# Patient Record
Sex: FEMALE | Race: WHITE | Employment: PART TIME | URBAN - METROPOLITAN AREA
[De-identification: names, ages, dates, MRNs, and addresses within clinical notes are randomized per-mention and may not be internally consistent; named-entity substitution may affect disease eponyms.]

---

## 2017-02-21 ENCOUNTER — OFFICE VISIT (OUTPATIENT)
Dept: FAMILY MEDICINE CLINIC | Age: 37
End: 2017-02-21

## 2017-02-21 VITALS
TEMPERATURE: 98.1 F | HEART RATE: 76 BPM | RESPIRATION RATE: 18 BRPM | BODY MASS INDEX: 20.19 KG/M2 | SYSTOLIC BLOOD PRESSURE: 90 MMHG | DIASTOLIC BLOOD PRESSURE: 62 MMHG | WEIGHT: 114 LBS

## 2017-02-21 DIAGNOSIS — J06.9 URI, ACUTE: ICD-10-CM

## 2017-02-21 DIAGNOSIS — J02.9 PHARYNGITIS, UNSPECIFIED ETIOLOGY: Primary | ICD-10-CM

## 2017-02-21 LAB — S PYO AG THROAT QL: NORMAL

## 2017-02-21 PROCEDURE — 99213 OFFICE O/P EST LOW 20 MIN: CPT | Performed by: FAMILY MEDICINE

## 2017-02-21 PROCEDURE — 87880 STREP A ASSAY W/OPTIC: CPT | Performed by: FAMILY MEDICINE

## 2017-02-21 RX ORDER — AMOXICILLIN 500 MG/1
500 CAPSULE ORAL 3 TIMES DAILY
Qty: 30 CAPSULE | Refills: 0 | Status: SHIPPED | OUTPATIENT
Start: 2017-02-21 | End: 2017-03-03

## 2020-09-18 ENCOUNTER — OFFICE VISIT (OUTPATIENT)
Dept: RHEUMATOLOGY | Age: 40
End: 2020-09-18
Payer: COMMERCIAL

## 2020-09-18 VITALS — BODY MASS INDEX: 22.86 KG/M2 | TEMPERATURE: 98.2 F | HEIGHT: 63 IN | WEIGHT: 129 LBS

## 2020-09-18 PROCEDURE — 99204 OFFICE O/P NEW MOD 45 MIN: CPT | Performed by: INTERNAL MEDICINE

## 2020-09-18 NOTE — PROGRESS NOTES
[address], [place]                                                           [address] [place] [address], [address] [address] (Y) [phone] (F)      Dear Dr. Gail Galvan MD:  Please find Rheumatology assessment. Thank you for giving me the opportunity to be involved in [address] [place] [address] and I look forward following Samir Jack along with you. If you have any questions or concerns please feel free to reach me. Note is transcribed using voice recognition software. Inadvertent computerized transcription errors may be present. Patient identification: Vidya Monet: 1980,39 y.o. Sex: female     A/P  Samir Jack was seen today for establish care and joint pain. Diagnoses and all orders for this visit:    Screening for rheumatic disorder    Arthralgia, unspecified joint  -     Rheumatoid Factor; Future  -     Cyclic Citrul Peptide Antibody, IgG; Future  -     Angiotensin Converting Enzyme; Future  -     Vitamin D 25 Hydroxy; Future      Nonspecific symptoms including intermittent arthralgias in her fingers, ankles, feet, with intermittent paresthesias of her fingertips. She is concerned about physiological lymph node in right submandibular area and change in her vision in last 4 years and seeing floaters. Believe that she might have something autoimmune. Her father had sarcoidosis, concerned about the possibility of having it, tells me that she read the literature, thought that it fits her symptoms-of lymphadenopathy and eye problems.   Rheumatic work-up including TONY, SSA, SSB, CRP, CBC D, CMP is normal.    I acknowledge that she is right- sarcoidosis can cause lymphadenopathy and ocular inflammation however Attempted to call patient via Post Procedural Symptom Tracker. Pt did not answer on day 13. Will attempt to call tomorrow. No contact protocol in place.   Tobacco comment: advised to quit   Substance and Sexual Activity    Alcohol use: Yes     Alcohol/week: 4.0 standard drinks     Types: 4 Standard drinks or equivalent per week    Drug use: No    Sexual activity: Yes     Partners: Male   Lifestyle    Physical activity     Days per week: Not on file     Minutes per session: Not on file    Stress: Not on file   Relationships    Social connections     Talks on phone: Not on file     Gets together: Not on file     Attends Yazdanism service: Not on file     Active member of club or organization: Not on file     Attends meetings of clubs or organizations: Not on file     Relationship status: Not on file    Intimate partner violence     Fear of current or ex partner: Not on file     Emotionally abused: Not on file     Physically abused: Not on file     Forced sexual activity: Not on file   Other Topics Concern    Not on file   Social History Narrative    Not on file       FH- Dad sarcoidosis. No current outpatient medications on file. No current facility-administered medications for this visit. No Known Allergies    PHYSICAL EXAM:    Vitals:    Temp 98.2 °F (36.8 °C) (Skin)   Ht 5' 3\" (1.6 m)   Wt 129 lb (58.5 kg)   BMI 22.85 kg/m²   General appearance/ Psychiatric: well nourished, and well groomed, normal judgement, alert, appears stated age and cooperative. MKS: She has completely normal musculoskeletal examination other than benign hypermobility noted in her finger joints-in upper, lower extremities and spine. No focal tenderness, swelling, synovitis, has full range of motion all peripheral joints in upper or lower extremities. Normal gait, muscle strength in upper and lower extremities. Skin: No rashes, no induration or skin thickening or nodules. No evidence ischemia or deformities noted in digits or nails. HEENT: Normal lids, lacrimal glands and pupils. No oral or nasal ulcers. Salivary glands reveal no evidence of abnormality.  External inspection of the ears and nose within normal limits. Neck: No masses or asymmetry. No thyroid enlargement. Chest: Normal effort, clear to auscultation. Heart:  Normal s1/s2, no leg edema. Lymph nodes: A small less than 0.5 mm diameter lymph node present in the right submandibular area, left armpit medial side, left groin-firm, nontender, freely mobile, is completely physiological in nature. All other areas-no lymphadenopathy including entire cervical, bilateral axillary, inguinal and elbow  Neurologic: normal deep tendon reflexes. No foot or wrist drop. DATA:   No results found for: WBC, HGB, HCT, MCV, PLT      Chemistry    No results found for: NA, K, CL, CO2, BUN, CREATININE No results found for: CALCIUM, ALKPHOS, AST, ALT, BILITOT       No results found for: LABURIC  No results found for: SEDRATE  No results found for: CRP  No results found for: TONY, RHEUMFACTOR, SEDRATE  No results found for: CKTOTAL  No results found for: TSH  No results found for: MQMY02      Radiology Review:    I reviewed images. A/P- See above.